# Patient Record
Sex: FEMALE | URBAN - METROPOLITAN AREA
[De-identification: names, ages, dates, MRNs, and addresses within clinical notes are randomized per-mention and may not be internally consistent; named-entity substitution may affect disease eponyms.]

---

## 2020-01-01 ENCOUNTER — HOSPITAL ENCOUNTER (INPATIENT)
Facility: HOSPITAL | Age: 65
LOS: 1 days | DRG: 871 | End: 2020-11-13
Attending: INTERNAL MEDICINE | Admitting: INTERNAL MEDICINE
Payer: MEDICARE

## 2020-01-01 VITALS
RESPIRATION RATE: 7 BRPM | HEART RATE: 64 BPM | SYSTOLIC BLOOD PRESSURE: 146 MMHG | OXYGEN SATURATION: 56 % | DIASTOLIC BLOOD PRESSURE: 110 MMHG

## 2020-01-01 DIAGNOSIS — K72.90 LIVER FAILURE: ICD-10-CM

## 2020-01-01 PROCEDURE — 94761 N-INVAS EAR/PLS OXIMETRY MLT: CPT

## 2020-01-01 PROCEDURE — 20000000 HC ICU ROOM

## 2020-01-01 PROCEDURE — 63600175 PHARM REV CODE 636 W HCPCS: Performed by: STUDENT IN AN ORGANIZED HEALTH CARE EDUCATION/TRAINING PROGRAM

## 2020-01-01 PROCEDURE — 99238 HOSP IP/OBS DSCHRG MGMT 30/<: CPT | Mod: GC,,, | Performed by: INTERNAL MEDICINE

## 2020-01-01 PROCEDURE — 27000221 HC OXYGEN, UP TO 24 HOURS

## 2020-01-01 PROCEDURE — 99238 PR HOSPITAL DISCHARGE DAY,<30 MIN: ICD-10-PCS | Mod: GC,,, | Performed by: INTERNAL MEDICINE

## 2020-01-01 RX ORDER — LORAZEPAM 2 MG/ML
2 INJECTION INTRAMUSCULAR
Status: DISCONTINUED | OUTPATIENT
Start: 2020-01-01 | End: 2020-01-01 | Stop reason: HOSPADM

## 2020-01-01 RX ORDER — ONDANSETRON 2 MG/ML
4 INJECTION INTRAMUSCULAR; INTRAVENOUS EVERY 8 HOURS PRN
Status: DISCONTINUED | OUTPATIENT
Start: 2020-01-01 | End: 2020-01-01 | Stop reason: HOSPADM

## 2020-01-01 RX ORDER — FLUDROCORTISONE ACETATE 0.1 MG/1
100 TABLET ORAL DAILY
Status: CANCELLED | OUTPATIENT
Start: 2020-01-01

## 2020-01-01 RX ORDER — NOREPINEPHRINE BITARTRATE/D5W 4MG/250ML
0.02 PLASTIC BAG, INJECTION (ML) INTRAVENOUS CONTINUOUS
Status: DISCONTINUED | OUTPATIENT
Start: 2020-01-01 | End: 2020-01-01

## 2020-01-01 RX ORDER — LORAZEPAM 2 MG/ML
2 INJECTION INTRAMUSCULAR
Status: DISCONTINUED | OUTPATIENT
Start: 2020-01-01 | End: 2020-01-01

## 2020-01-01 RX ORDER — FAMOTIDINE 20 MG/1
20 TABLET, FILM COATED ORAL 2 TIMES DAILY
Status: CANCELLED | OUTPATIENT
Start: 2020-01-01

## 2020-01-01 RX ORDER — MORPHINE SULFATE 1 MG/ML
0.5 INJECTION, SOLUTION INTRAVENOUS CONTINUOUS
Status: DISCONTINUED | OUTPATIENT
Start: 2020-01-01 | End: 2020-01-01 | Stop reason: HOSPADM

## 2020-01-01 RX ORDER — FENTANYL CITRATE 50 UG/ML
25 INJECTION, SOLUTION INTRAMUSCULAR; INTRAVENOUS
Status: DISCONTINUED | OUTPATIENT
Start: 2020-01-01 | End: 2020-01-01 | Stop reason: HOSPADM

## 2020-01-01 RX ORDER — MORPHINE SULFATE 2 MG/ML
4 INJECTION, SOLUTION INTRAMUSCULAR; INTRAVENOUS
Status: DISCONTINUED | OUTPATIENT
Start: 2020-01-01 | End: 2020-01-01 | Stop reason: HOSPADM

## 2020-01-01 RX ORDER — SODIUM CHLORIDE 0.9 % (FLUSH) 0.9 %
10 SYRINGE (ML) INJECTION
Status: DISCONTINUED | OUTPATIENT
Start: 2020-01-01 | End: 2020-01-01 | Stop reason: HOSPADM

## 2020-01-01 RX ADMIN — MORPHINE SULFATE 4 MG: 2 INJECTION, SOLUTION INTRAMUSCULAR; INTRAVENOUS at 04:11

## 2020-01-01 RX ADMIN — LORAZEPAM 2 MG: 2 INJECTION INTRAMUSCULAR; INTRAVENOUS at 04:11

## 2020-11-13 PROBLEM — J96.01 ACUTE RESPIRATORY FAILURE WITH HYPOXIA: Status: ACTIVE | Noted: 2020-01-01

## 2020-11-13 PROBLEM — R65.21 SEPTIC SHOCK: Status: ACTIVE | Noted: 2020-01-01

## 2020-11-13 PROBLEM — K72.90 LIVER FAILURE: Status: ACTIVE | Noted: 2020-01-01

## 2020-11-13 PROBLEM — A41.9 SEPTIC SHOCK: Status: ACTIVE | Noted: 2020-01-01

## 2020-11-13 PROBLEM — Z66 DNR (DO NOT RESUSCITATE): Status: ACTIVE | Noted: 2020-01-01

## 2020-11-13 PROBLEM — N17.9 ACUTE RENAL FAILURE: Status: ACTIVE | Noted: 2020-01-01

## 2020-11-13 NOTE — HPI
Jesica Garcia is a 65 y.o. female with past medical history of CARBAJAL Cirrhosis, T2DM, ILD, Pulm HTN, who initially presented to OSH with joint pains ( L wrist), non-bloody diarrhea, and productive cough for several days. She denied abdominal pain, fever / chills, nausea / vomiting, chest pain. She denied COVID contacts. She was noted to be lethargic and short of breath. She was tachycardic and hypotensive, hypoxic (94% on 4L NC) but afebrile. Labs at osh: wbc 4.1, Hb 11, plt 23, Na 130, Cr 1.7/BUN 32, CO2 11.6, trop 0.191. CXR was obtained which was notable for diffuse bilateral interstitial and airspace opacities, suspicious for multifocal infection (covid negative at osh). She was given narcan, amp D50 (hypoglycemic on arrival 39), and liter bolus, with some improvement in mentation. However, she became more hypotensive (75/40) and dopamine infusion was started. She stared to develop runs of SVT to 165-170 and dopamine was held. Case was discussed with cardiology and she was bolused amiodarone. She was resumed on dopamine and her blood pressure stabilized. She was subsequently transferred to [facility] for higher level of care.

## 2020-11-13 NOTE — CARE UPDATE
Critical Care Medicine                                                                                       Death Note      Called to bedside by patient's nurse. Nursing supervisor notified. Family at bedside.  has been called and is also at bedside.    Patient is not responding to verbal or tactile stimuli. Patient does not have a pupillary reflex. Her pupils are fixed and dilated. No heart or breath sounds on auscultation. No respirations. No palpable pulses.     Asystole noted on monitor.    Time of death: 1720    Cause of Death: Sepsis    Mack Rod MD  Internal Medicine, PGY3

## 2020-11-13 NOTE — SUBJECTIVE & OBJECTIVE
No past medical history on file.    No past surgical history on file.    Review of patient's allergies indicates:  No Known Allergies    Family History     None        Tobacco Use    Smoking status: Not on file   Substance and Sexual Activity    Alcohol use: Not on file    Drug use: Not on file    Sexual activity: Not on file      Review of Systems   Unable to perform ROS: Acuity of condition     Objective:     Vital Signs (Most Recent):  Pulse: (!) 122 (11/13/20 1510)  Resp: (!) 27 (11/13/20 1510)  BP: (!) 146/110 (11/13/20 1510)  SpO2: (!) 91 % (11/13/20 1510) Vital Signs (24h Range):  Pulse:  [122] 122  Resp:  [27] 27  SpO2:  [91 %] 91 %  BP: (146)/(110) 146/110      There is no height or weight on file to calculate BMI.    No intake or output data in the 24 hours ending 11/13/20 1547    Physical Exam  Constitutional:       Appearance: She is ill-appearing.      Comments: On NRB   HENT:      Head: Normocephalic and atraumatic.      Mouth/Throat:      Mouth: Mucous membranes are moist.   Cardiovascular:      Rate and Rhythm: Normal rate.      Heart sounds: No murmur.   Pulmonary:      Effort: Respiratory distress present.   Abdominal:      General: Abdomen is flat. There is no distension.      Palpations: Abdomen is soft.      Tenderness: There is no abdominal tenderness.   Musculoskeletal: Normal range of motion.         General: No swelling or tenderness.   Skin:     General: Skin is warm and dry.         Vents:     Lines/Drains/Airways     None               Significant Labs:    CBC/Anemia Profile:  No results for input(s): WBC, HGB, HCT, PLT, MCV, RDW, IRON, FERRITIN, RETIC, FOLATE, [id number], OCCULTBLOOD in the last 48 hours.     Chemistries:  No results for input(s): NA, K, CL, CO2, BUN, CREATININE, CALCIUM, ALBUMIN, PROT, BILITOT, ALKPHOS, ALT, AST, GLUCOSE, MG, PHOS in the last 48 hours.

## 2020-11-13 NOTE — DISCHARGE SUMMARY
[facility]  Critical Care Medicine  Discharge Summary      Patient Name: Jesica Garcia  MRN: [id number]  Admission Date: 11/13/2020  Hospital Length of Stay: 0 days  Discharge Date and Time:  11/13/2020 5:30 PM  Attending Physician: Miguel Ángel Mccracken MD   Discharging Provider: Mack Rod MD  Primary Care Provider: Primary Doctor No  Reason for Admission: shock    HPI:   Jesica Garcia is a 65 y.o. female with past medical history of CARBAJAL Cirrhosis, T2DM, ILD, Pulm HTN, who initially presented to OSH with joint pains ( L wrist), non-bloody diarrhea, and productive cough for several days. She denied abdominal pain, fever / chills, nausea / vomiting, chest pain. She denied COVID contacts. She was noted to be lethargic and short of breath. She was tachycardic and hypotensive, hypoxic (94% on 4L NC) but afebrile. Labs at osh: wbc 4.1, Hb 11, plt 23, Na 130, Cr 1.7/BUN 32, CO2 11.6, trop 0.191. CXR was obtained which was notable for diffuse bilateral interstitial and airspace opacities, suspicious for multifocal infection (covid negative at osh). She was given narcan, amp D50 (hypoglycemic on arrival 39), and liter bolus, with some improvement in mentation. However, she became more hypotensive (75/40) and dopamine infusion was started. She stared to develop runs of SVT to 165-170 and dopamine was held. Case was discussed with cardiology and she was bolused amiodarone. She was resumed on dopamine and her blood pressure stabilized. She was subsequently transferred to [facility] for higher level of care.       Indwelling Lines/Drains at Time of Discharge:   Lines/Drains/Airways     None               Hospital Course:   Patient was admitted to critical care. She was noted to be encephalopathic, hypotensive, and saturating in 80s on 100% NRB. Upon patient's arrival, discussion with family who were en route to [facility] about goals of care and family requested to make patient DNR, transition to comfort care  once they arrived and were able to be with patient.  was notified and upon arrival of family, morphine gtt was started for dyspnea. Pressors were turned off. Patient subsequently passed (see previous note).     Consults (From admission, onward)        Status Ordering Provider     Inpatient consult to [facility]  Once     Provider:  (Not yet assigned)    Ordered MACK HECK          Pending Diagnostic Studies:     None        Final Active Diagnoses:    Diagnosis Date Noted POA    PRINCIPAL PROBLEM:  Acute respiratory failure with hypoxia [J96.01] 2020 Yes    Liver failure [K72.90] 2020 Yes    Septic shock [A41.9, R65.21] 2020 Yes    DNR (do not resuscitate) [Z66] 2020 Unknown    Acute renal failure [N17.9] 2020 Yes      Problems Resolved During this Admission:     No new Assessment & Plan notes have been filed under this hospital service since the last note was generated.  Service: Critical Care Medicine    Discharged Condition:     Disposition:     Medications:  None (patient  at medical facility)     Mack Heck MD  Critical Care Medicine  [facility]

## 2020-11-13 NOTE — ED NOTES
FLIGHT CARE TRANSPORT NOTE     Date of Transport: 2020  : 1955  Age: 65 y.o.  Medication Dosing Weight: 108kg  Sex: female  Race: Unknown    MRN: [id number]  Time Of Patient Handoff: 1440    ASSESSMENT/INTERVENTIONS     This patient was transported by [facility] from the ED of [facility]. The patient's overall condition remained unchanged throughout transport, with all vital signs remaining stable per the patient's current baseline. All lines, tubes, and devices remained patent and intact. The patient was transferred from the [facility] stretcher to [facility] bed 6065 where care was transitioned to [facility] without incident.     VS on arrival of 6065: BP 90//65, , RR22, Oxygen saturation 92%.      FOLLOW-UP     Call [facility], Bere Schultz RN at [phone] for additional questions or concerns.

## 2020-11-13 NOTE — HOSPITAL COURSE
Patient was admitted to critical care. She was noted to be encephalopathic, hypotensive, and saturating in 80s on 100% NRB. Upon patient's arrival, discussion with family who were en route to [facility] about goals of care and family requested to make patient DNR, transition to comfort care once they arrived and were able to be with patient.  was notified and upon arrival of family, morphine gtt was started for dyspnea. Pressors were turned off. Patient subsequently passed (see previous note).

## 2020-11-13 NOTE — H&P
[facility]  Critical Care Medicine  History & Physical    Patient Name: Jesica Garcia  MRN: [id number]  Admission Date: 11/13/2020  Hospital Length of Stay: 0 days  Code Status: DNR  Attending Physician: Miguel Ángel Mccracken MD   Primary Care Provider: Primary Doctor No   Principal Problem: Acute respiratory failure with hypoxia    Subjective:     HPI:  Jesica Garcia is a 65 y.o. female with past medical history of CARBAJAL Cirrhosis, T2DM, ILD, Pulm HTN, who initially presented to OSH with joint pains ( L wrist), non-bloody diarrhea, and productive cough for several days. She denied abdominal pain, fever / chills, nausea / vomiting, chest pain. She denied COVID contacts. She was noted to be lethargic and short of breath. She was tachycardic and hypotensive, hypoxic (94% on 4L NC) but afebrile. Labs at osh: wbc 4.1, Hb 11, plt 23, Na 130, Cr 1.7/BUN 32, CO2 11.6, trop 0.191. CXR was obtained which was notable for diffuse bilateral interstitial and airspace opacities, suspicious for multifocal infection (covid negative at osh). She was given narcan, amp D50 (hypoglycemic on arrival 39), and liter bolus, with some improvement in mentation. However, she became more hypotensive (75/40) and dopamine infusion was started. She stared to develop runs of SVT to 165-170 and dopamine was held. Case was discussed with cardiology and she was bolused amiodarone. She was resumed on dopamine and her blood pressure stabilized. She was subsequently transferred to [facility] for higher level of care.     Hospital/ICU Course:  No notes on file     No past medical history on file.    No past surgical history on file.    Review of patient's allergies indicates:  No Known Allergies    Family History     None        Tobacco Use    Smoking status: Not on file   Substance and Sexual Activity    Alcohol use: Not on file    Drug use: Not on file    Sexual activity: Not on file      Review of Systems   Unable to perform ROS: Acuity of  condition     Objective:     Vital Signs (Most Recent):  Pulse: (!) 122 (11/13/20 1510)  Resp: (!) 27 (11/13/20 1510)  BP: (!) 146/110 (11/13/20 1510)  SpO2: (!) 91 % (11/13/20 1510) Vital Signs (24h Range):  Pulse:  [122] 122  Resp:  [27] 27  SpO2:  [91 %] 91 %  BP: (146)/(110) 146/110      There is no height or weight on file to calculate BMI.    No intake or output data in the 24 hours ending 11/13/20 1547    Physical Exam  Constitutional:       Appearance: She is ill-appearing.      Comments: On NRB   HENT:      Head: Normocephalic and atraumatic.      Mouth/Throat:      Mouth: Mucous membranes are moist.   Cardiovascular:      Rate and Rhythm: Normal rate.      Heart sounds: No murmur.   Pulmonary:      Effort: Respiratory distress present.   Abdominal:      General: Abdomen is flat. There is no distension.      Palpations: Abdomen is soft.      Tenderness: There is no abdominal tenderness.   Musculoskeletal: Normal range of motion.         General: No swelling or tenderness.   Skin:     General: Skin is warm and dry.         Vents:     Lines/Drains/Airways     None               Significant Labs:    CBC/Anemia Profile:  No results for input(s): WBC, HGB, HCT, PLT, MCV, RDW, IRON, FERRITIN, RETIC, FOLATE, [id number], OCCULTBLOOD in the last 48 hours.     Chemistries:  No results for input(s): NA, K, CL, CO2, BUN, CREATININE, CALCIUM, ALBUMIN, PROT, BILITOT, ALKPHOS, ALT, AST, GLUCOSE, MG, PHOS in the last 48 hours.      Assessment/Plan:     Pulmonary  * Acute respiratory failure with hypoxia  Upon arrival, Dr Mccracken spoke with family regarding goals of care, and family expressed regret that patient was transferred and wished to make patient DNR and to transition to comfort care.     - morphine gtt ordered for dyspnea  - will transition to comfort care once family arrives        Critical Care Daily Checklist:    A: Awake: RASS Goal/Actual Goal:    Actual:     B: Spontaneous Breathing Trial Performed?     C: SAT &  SBT Coordinated?  na                      D: Delirium: CAM-ICU     E: Early Mobility Performed? Yes   F: Feeding Goal:    Status:     Current Diet Order   Procedures    Diet NPO      AS: Analgesia/Sedation morphine   T: Thromboembolic Prophylaxis    H: HOB > 300 Yes   U: Stress Ulcer Prophylaxis (if needed)    G: Glucose Control    B: Bowel Function     I: Indwelling Catheter (Lines & Sena) Necessity    D: De-escalation of Antimicrobials/Pharmacotherapies     Plan for the day/ETD     Code Status:  Family/Goals of Care: DNR         Critical secondary to Patient has a condition that poses threat to life and bodily function: Severe Respiratory Distress     Critical care was time spent personally by me on the following activities: development of treatment plan with patient or surrogate and bedside caregivers, discussions with consultants, evaluation of patient's response to treatment, examination of patient, ordering and performing treatments and interventions, ordering and review of laboratory studies, ordering and review of radiographic studies, pulse oximetry, re-evaluation of patient's condition. This critical care time did not overlap with that of any other provider or involve time for any procedures.     Mack Rod MD  Critical Care Medicine  [facility]

## 2020-11-13 NOTE — NURSING
Daughter and sister-in-law at bedside.   Dr. Mccracken notified and immediately came to bedside.    also called and has come and talked with family at bedside.   Pt. Made comfort care.   I talked with the family members at the patients side and we worked to position patient to a comfortable position. Per family request, NRB mask removed and exchanged for NC at 6L. Pt. Then appeared much less agitated.   Intermittent pushes (morphine iv push x1 and ativan push x1 per orders ) administered while waiting for pharmacy to deliver continuous gtts. Soon after 1700 pm, pt. Skip. Levo stopped. Oxygen stopped. MD came to bedside.    called and came to bedside.

## 2020-11-13 NOTE — ASSESSMENT & PLAN NOTE
Upon arrival, Dr Mccracken spoke with family regarding goals of care, and family expressed regret that patient was transferred and wished to make patient DNR and to transition to comfort care.     - morphine gtt ordered for dyspnea  - will transition to comfort care once family arrives

## 2020-11-14 NOTE — NURSING
Received patient, , pending clearance from [place] 's office. Spoke with [facility] @ [phone] and requested callback from [place] .

## 2020-11-14 NOTE — CHAPLAIN
"Spoke to [facility] sheriff as directed by [facility] coroner voicemail system.  office appears to be closed.  stated, "they are not 24/7 and the are not open on weekends. Decedent is on hold awaiting response from [facility] coroner. May not be until Monday 11/16/2020.   "

## 2020-11-16 NOTE — PHYSICIAN QUERY
PT Name: Jesica Garcia  MR #: [id number]     CDS/: Katie Herman               Contact information:[email or web address]  This form is a permanent document in the medical record.    Query Date: November 16, 2020    Neurological Condition Clarification    By submitting this query, we are merely seeking further clarification of documentation to reflect the severity of illness of your patient. Please utilize your independent clinical judgment when addressing the question(s) below.    The Medical record reflects the following:     Indicators   Supporting Clinical Findings Location in Medical Record   x AMS, Confusion,  LOC, etc.  She was noted to be lethargic and short of breath   H&P 11/13   x Acute/Chronic Illness past medical history of CARBAJAL Cirrhosis, T2DM, ILD, Pulm HTN    Septic shock   Acute respiratory failure with hypoxia   Liver failure   Acute renal failure    DS 11/13   x Radiology Findings CXR was obtained which was notable for diffuse bilateral interstitial and airspace opacities, suspicious for multifocal infection    H&P 11/13    Electrolyte Imbalance      Medication     x Treatment         She was given narcan, amp D50 (hypoglycemic on arrival 39), and liter bolus, with some improvement in mentation   H&P 11/13   x Other She was noted to be encephalopathic, hypotensive, and saturating in 80s on 100% NRB    Labs at osh: wbc 4.1, Hb 11, plt 23, Na 130, Cr 1.7/BUN 32, CO2 11.6, trop 0.191.    DS 11/13     Encephalopathy- is a general term for any diffuse disease of the brain that alters brain function or structure. Treatment of the cognitive dysfunction varies but is ultimately dependent on the treatment of the underlying condition.    Major Symptoms of Encephalopathy - Decreased level of consciousness, fluctuating alertness/concentration, confusion, agitation, lethargy, somnolence, drowsiness, obtundation, stupor, or coma.  References: National Institutes of Healths (NIH) National [place] of  Neurological Disorders and Strokes;  HCPro 2016; Advisory Board     The noted clinical guidelines are only system guidelines and do not replace the providers clinical judgment.    Provider, please specify the diagnosis or diagnoses associated with above clinical findings.  [   ] Metabolic Encephalopathy - Due to electrolyte imbalance, metabolic derangements, or infectious processes, includes Septic Encephalopathy, Uremic Encephalopathy   [ x ] Hepatic Encephalopathy - Due to liver disease/failure   [   ] Encephalopathy, unspecified      [   ] Other Encephalopathy (please specify): ____________________   [   ]  Clinically Undetermined     Please document in your progress notes daily for the duration of treatment until resolved, and include in your discharge summary.

## 2020-11-16 NOTE — PHYSICIAN QUERY
PT Name: Jesica Garcia  MR #: [id number]     ACUITY OF CONDITION CLARIFICATION      CDS/: Katie Herman RN               Contact information:[email or web address]  This form is a permanent document in the medical record.     Query Date: November 16, 2020    By submitting this query, we are merely seeking further clarification of documentation to reflect the severity of illness of your patient. Please utilize your independent clinical judgment when addressing the question(s) below.    The Medical record reflects the following:     Indicators   Supporting Clinical Findings Location in Medical Record   x Documentation of condition Liver failure    DS 11/13   x Lab Value(s) Labs at osh: wbc 4.1, Hb 11, plt 23, Na 130, Cr 1.7/BUN 32, CO2 11.6, trop 0.191.     DS 11/13    Radiology Findings     x Treatment/Medication She was given narcan, amp D50 (hypoglycemic on arrival 39), and liter bolus, with some improvement in mentation   H&P 11/13     x Other She was noted to be lethargic and short of breath    past medical history of CARBAJAL Cirrhosis, T2DM, ILD, Pulm HTN H&P 11/13      DS 11/13      Provider, please specify the acuity/chronicity of __Liver failure__:    [   ] Acute   [   ] Chronic   [   ] Subacute   [  x ] Acute on chronic   [   ]  Clinically Undetermined     Please document in your progress notes daily for the duration of treatment until resolved, and include in your discharge summary.